# Patient Record
Sex: MALE | Race: WHITE | NOT HISPANIC OR LATINO | Employment: UNEMPLOYED | URBAN - METROPOLITAN AREA
[De-identification: names, ages, dates, MRNs, and addresses within clinical notes are randomized per-mention and may not be internally consistent; named-entity substitution may affect disease eponyms.]

---

## 2017-07-12 ENCOUNTER — HOSPITAL ENCOUNTER (INPATIENT)
Facility: HOSPITAL | Age: 32
LOS: 2 days | Discharge: HOME | DRG: 897 | End: 2017-07-14
Attending: PSYCHIATRY & NEUROLOGY | Admitting: PSYCHIATRY & NEUROLOGY
Payer: MEDICAID

## 2017-07-12 DIAGNOSIS — F29 UNSPECIFIED PSYCHOSIS: ICD-10-CM

## 2017-07-12 DIAGNOSIS — F19.10 SUBSTANCE ABUSE, DAILY USE: Primary | Chronic | ICD-10-CM

## 2017-07-12 PROBLEM — F43.10 PTSD (POST-TRAUMATIC STRESS DISORDER): Chronic | Status: ACTIVE | Noted: 2017-07-12

## 2017-07-12 PROCEDURE — 12400001 HC PSYCH SEMI-PRIVATE ROOM

## 2017-07-12 RX ORDER — THIAMINE HCL 100 MG
100 TABLET ORAL DAILY
Status: DISCONTINUED | OUTPATIENT
Start: 2017-07-13 | End: 2017-07-14 | Stop reason: HOSPADM

## 2017-07-12 RX ORDER — IBUPROFEN 200 MG
1 TABLET ORAL
Status: DISCONTINUED | OUTPATIENT
Start: 2017-07-13 | End: 2017-07-12

## 2017-07-12 RX ORDER — LOPERAMIDE HYDROCHLORIDE 2 MG/1
2 CAPSULE ORAL
Status: DISCONTINUED | OUTPATIENT
Start: 2017-07-12 | End: 2017-07-14 | Stop reason: HOSPADM

## 2017-07-12 RX ORDER — IBUPROFEN 400 MG/1
400 TABLET ORAL EVERY 6 HOURS PRN
Status: DISCONTINUED | OUTPATIENT
Start: 2017-07-12 | End: 2017-07-14 | Stop reason: HOSPADM

## 2017-07-12 RX ORDER — FOLIC ACID 1 MG/1
1 TABLET ORAL DAILY
Status: DISCONTINUED | OUTPATIENT
Start: 2017-07-13 | End: 2017-07-14 | Stop reason: HOSPADM

## 2017-07-12 RX ORDER — METHOCARBAMOL 500 MG/1
500 TABLET, FILM COATED ORAL 4 TIMES DAILY PRN
Status: DISCONTINUED | OUTPATIENT
Start: 2017-07-12 | End: 2017-07-14 | Stop reason: HOSPADM

## 2017-07-12 RX ORDER — DIPHENHYDRAMINE HCL 25 MG
4 CAPSULE ORAL
Status: DISCONTINUED | OUTPATIENT
Start: 2017-07-12 | End: 2017-07-12

## 2017-07-12 RX ORDER — OLANZAPINE 10 MG/1
10 TABLET ORAL EVERY 4 HOURS PRN
Status: DISCONTINUED | OUTPATIENT
Start: 2017-07-12 | End: 2017-07-14 | Stop reason: HOSPADM

## 2017-07-12 RX ORDER — OLANZAPINE 10 MG/2ML
10 INJECTION, POWDER, FOR SOLUTION INTRAMUSCULAR EVERY 4 HOURS PRN
Status: DISCONTINUED | OUTPATIENT
Start: 2017-07-12 | End: 2017-07-14 | Stop reason: HOSPADM

## 2017-07-12 RX ORDER — HYDROXYZINE PAMOATE 50 MG/1
50 CAPSULE ORAL NIGHTLY PRN
Status: DISCONTINUED | OUTPATIENT
Start: 2017-07-12 | End: 2017-07-14 | Stop reason: HOSPADM

## 2017-07-12 RX ORDER — CLONIDINE HYDROCHLORIDE 0.1 MG/1
0.1 TABLET ORAL EVERY 4 HOURS PRN
Status: DISCONTINUED | OUTPATIENT
Start: 2017-07-12 | End: 2017-07-14 | Stop reason: HOSPADM

## 2017-07-12 RX ORDER — PRAZOSIN HYDROCHLORIDE 1 MG/1
1 CAPSULE ORAL NIGHTLY
Status: DISCONTINUED | OUTPATIENT
Start: 2017-07-12 | End: 2017-07-14 | Stop reason: HOSPADM

## 2017-07-12 RX ORDER — ONDANSETRON 4 MG/1
4 TABLET, ORALLY DISINTEGRATING ORAL EVERY 6 HOURS PRN
Status: DISCONTINUED | OUTPATIENT
Start: 2017-07-12 | End: 2017-07-14 | Stop reason: HOSPADM

## 2017-07-12 RX ORDER — IBUPROFEN 200 MG
1 TABLET ORAL
Status: DISCONTINUED | OUTPATIENT
Start: 2017-07-13 | End: 2017-07-14 | Stop reason: HOSPADM

## 2017-07-13 LAB
ALBUMIN SERPL BCP-MCNC: 4 G/DL
ALP SERPL-CCNC: 75 U/L
ALT SERPL W/O P-5'-P-CCNC: 12 U/L
AMPHET+METHAMPHET UR QL: NEGATIVE
ANION GAP SERPL CALC-SCNC: 7 MMOL/L
AST SERPL-CCNC: 15 U/L
BARBITURATES UR QL SCN>200 NG/ML: NEGATIVE
BASOPHILS # BLD AUTO: 0.04 K/UL
BASOPHILS NFR BLD: 0.8 %
BENZODIAZ UR QL SCN>200 NG/ML: NEGATIVE
BILIRUB SERPL-MCNC: 1 MG/DL
BILIRUB UR QL STRIP: NEGATIVE
BUN SERPL-MCNC: 14 MG/DL
BZE UR QL SCN: NORMAL
CALCIUM SERPL-MCNC: 9.6 MG/DL
CANNABINOIDS UR QL SCN: NEGATIVE
CHLORIDE SERPL-SCNC: 106 MMOL/L
CHOLEST/HDLC SERPL: 3 {RATIO}
CLARITY UR REFRACT.AUTO: CLEAR
CO2 SERPL-SCNC: 26 MMOL/L
COLOR UR AUTO: YELLOW
CREAT SERPL-MCNC: 1 MG/DL
CREAT UR-MCNC: 195 MG/DL
DIFFERENTIAL METHOD: ABNORMAL
EOSINOPHIL # BLD AUTO: 0.2 K/UL
EOSINOPHIL NFR BLD: 4.2 %
ERYTHROCYTE [DISTWIDTH] IN BLOOD BY AUTOMATED COUNT: 18.6 %
EST. GFR  (AFRICAN AMERICAN): >60 ML/MIN/1.73 M^2
EST. GFR  (NON AFRICAN AMERICAN): >60 ML/MIN/1.73 M^2
ESTIMATED AVG GLUCOSE: 97 MG/DL
FOLATE SERPL-MCNC: 10.7 NG/ML
GLUCOSE SERPL-MCNC: 91 MG/DL
GLUCOSE UR QL STRIP: NEGATIVE
HAV IGM SERPL QL IA: NEGATIVE
HBA1C MFR BLD HPLC: 5 %
HBV CORE IGM SERPL QL IA: NEGATIVE
HBV SURFACE AG SERPL QL IA: NEGATIVE
HCT VFR BLD AUTO: 43.4 %
HCV AB SERPL QL IA: NEGATIVE
HDL/CHOLESTEROL RATIO: 33.7 %
HDLC SERPL-MCNC: 163 MG/DL
HDLC SERPL-MCNC: 55 MG/DL
HGB BLD-MCNC: 13.8 G/DL
HGB UR QL STRIP: NEGATIVE
HIV1+2 IGG SERPL QL IA.RAPID: NEGATIVE
KETONES UR QL STRIP: NEGATIVE
LDLC SERPL CALC-MCNC: 91 MG/DL
LEUKOCYTE ESTERASE UR QL STRIP: NEGATIVE
LYMPHOCYTES # BLD AUTO: 2.1 K/UL
LYMPHOCYTES NFR BLD: 41.5 %
MCH RBC QN AUTO: 22.9 PG
MCHC RBC AUTO-ENTMCNC: 31.8 %
MCV RBC AUTO: 72 FL
METHADONE UR QL SCN>300 NG/ML: NEGATIVE
MONOCYTES # BLD AUTO: 0.4 K/UL
MONOCYTES NFR BLD: 7.9 %
NEUTROPHILS # BLD AUTO: 2.3 K/UL
NEUTROPHILS NFR BLD: 45.6 %
NITRITE UR QL STRIP: NEGATIVE
NONHDLC SERPL-MCNC: 108 MG/DL
OPIATES UR QL SCN: NEGATIVE
PCP UR QL SCN>25 NG/ML: NEGATIVE
PH UR STRIP: 5 [PH] (ref 5–8)
PLATELET # BLD AUTO: 252 K/UL
PMV BLD AUTO: 9.4 FL
POTASSIUM SERPL-SCNC: 4.3 MMOL/L
PROT SERPL-MCNC: 7.3 G/DL
PROT UR QL STRIP: NEGATIVE
RBC # BLD AUTO: 6.03 M/UL
RPR SER QL: NORMAL
SODIUM SERPL-SCNC: 139 MMOL/L
SP GR UR STRIP: 1.02 (ref 1–1.03)
TOXICOLOGY INFORMATION: NORMAL
TRIGL SERPL-MCNC: 85 MG/DL
TSH SERPL DL<=0.005 MIU/L-ACNC: 1.29 UIU/ML
URN SPEC COLLECT METH UR: NORMAL
UROBILINOGEN UR STRIP-ACNC: NEGATIVE EU/DL
VIT B12 SERPL-MCNC: 338 PG/ML
WBC # BLD AUTO: 4.96 K/UL

## 2017-07-13 PROCEDURE — 86592 SYPHILIS TEST NON-TREP QUAL: CPT

## 2017-07-13 PROCEDURE — 80053 COMPREHEN METABOLIC PANEL: CPT

## 2017-07-13 PROCEDURE — 93005 ELECTROCARDIOGRAM TRACING: CPT

## 2017-07-13 PROCEDURE — 82746 ASSAY OF FOLIC ACID SERUM: CPT

## 2017-07-13 PROCEDURE — 99223 1ST HOSP IP/OBS HIGH 75: CPT | Mod: AF,S$PBB,, | Performed by: PSYCHIATRY & NEUROLOGY

## 2017-07-13 PROCEDURE — 36415 COLL VENOUS BLD VENIPUNCTURE: CPT

## 2017-07-13 PROCEDURE — 12400001 HC PSYCH SEMI-PRIVATE ROOM

## 2017-07-13 PROCEDURE — 80074 ACUTE HEPATITIS PANEL: CPT

## 2017-07-13 PROCEDURE — 84443 ASSAY THYROID STIM HORMONE: CPT

## 2017-07-13 PROCEDURE — 80061 LIPID PANEL: CPT

## 2017-07-13 PROCEDURE — 81003 URINALYSIS AUTO W/O SCOPE: CPT

## 2017-07-13 PROCEDURE — 85025 COMPLETE CBC W/AUTO DIFF WBC: CPT

## 2017-07-13 PROCEDURE — 25000003 PHARM REV CODE 250: Performed by: PSYCHIATRY & NEUROLOGY

## 2017-07-13 PROCEDURE — 86703 HIV-1/HIV-2 1 RESULT ANTBDY: CPT

## 2017-07-13 PROCEDURE — 83036 HEMOGLOBIN GLYCOSYLATED A1C: CPT

## 2017-07-13 PROCEDURE — 82607 VITAMIN B-12: CPT

## 2017-07-13 PROCEDURE — 93010 ELECTROCARDIOGRAM REPORT: CPT | Mod: ,,, | Performed by: INTERNAL MEDICINE

## 2017-07-13 PROCEDURE — 80307 DRUG TEST PRSMV CHEM ANLYZR: CPT

## 2017-07-13 RX ADMIN — Medication 100 MG: at 12:07

## 2017-07-13 RX ADMIN — FOLIC ACID 1 MG: 1 TABLET ORAL at 12:07

## 2017-07-13 RX ADMIN — THERA TABS 1 TABLET: TAB at 12:07

## 2017-07-13 NOTE — PROGRESS NOTES
Pt slept 8 hours, irritable and uncooperative during the evening, remained isolated in his room. Will continue to monitor.

## 2017-07-13 NOTE — PROGRESS NOTES
07/13/17 0900 07/13/17 1100 07/13/17 1400   [facility] Group Therapy   Group Name Community Reintegration Stress Management Therapeutic Recreation   Specific Interventions Current Events Relaxation Training Crafts   Participation Level None Supportive;Appropriate;Active Supportive;Appropriate   Participation Quality --  Cooperative;Social Cooperative   Insight/Motivation --  Good Good   Affect/Mood Display --  Appropriate Appropriate   Cognition --  Oriented Oriented
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
## 2017-07-13 NOTE — PROGRESS NOTES
07/12/17 2000   [facility] Group Therapy   Group Name Community Reintegration   Specific Interventions Other (see comments)   Participation Level None

## 2017-07-13 NOTE — ASSESSMENT & PLAN NOTE
"Patient reports command AH to kill himself as well as seeing shadows that began 5 years ago during a stressful time while incarcerated. May be substance induced or related to schizophrenia or schizoaffective d/o (patient also endorses manic symptoms outside of substance use). Using multiple drugs, cocaine, meth. "whatever I can get."    - patient no longer reporting symptoms of psychosis. He states that he had gone 2 weeks without sleep because he was using IV cocaine and meth which caused him to "hear voices" which have now resolved. He denies suicidal thoughts, homicidal thoughts or plan to kill or harm himself. He reiterates that he does not have a mental illness. Patient exhibits goal oriented, linear and logical thought process. Not responding to internal stimuli or distracted by hallucinations. No overt, objective signs of itzel or depression. Behavior is calm and cooperative. He plans to continue to search for rehab placement once discharged. The patient requests discharge and is not suicidal, homicidal or gravely disabled. Patient adamantly refusing all medications at this time despite multiple offers.  "
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
## 2017-07-13 NOTE — PLAN OF CARE
Problem: Patient Care Overview (Adult)  Goal: Plan of Care Review  Outcome: Ongoing (interventions implemented as appropriate)  Pt admitted this evening uncooperative for assessment irritable and tired. Pt answers are superficial would not be specific about drug and alcohol use. Interview terminated pt went to room and is in bed resting. Refused HS medicine will continue to monitor for safety and withdrawals.     Problem: Impaired Control (Excessive Substance Use) (Adult)  Goal: Participates in Recovery Program  Outcome: Ongoing (interventions implemented as appropriate)  Pt isolated in his room all night.     Problem: Safety Awareness Impairment (Excessive Substance Use) (Adult)  Goal: Enhanced Safety Awareness  Outcome: Ongoing (interventions implemented as appropriate)  Environmental and safety rounds completed, fall precautions in place.     Problem: Physiological Impairment (Excessive Substance Use) (Adult)  Goal: Improved Physiologic Symptoms  Outcome: Ongoing (interventions implemented as appropriate)  Pt vital signs will be monitored every 4 hours, for withdrawal symptoms.
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
## 2017-07-13 NOTE — PT/OT/SLP PROGRESS
Occupational Therapy  Pt Update    Ajith Prajapati  MRN: [id number]    Patient not seen today secondary to team requested to hold so pt could sleep due to increased agitation. Will follow-up at next available time..    DARIA Serna  7/13/2017

## 2017-07-13 NOTE — HOSPITAL COURSE
"7/12/17: transferred from OSH for AVH and substance abuse. Also endorses PTSD symptoms and possible manic symptoms outside of substance use. Started on prazosin 1 mg qHS and Zyprexa PRN agitation as well as PRN medications for withdrawal symptoms.    7/14/17- patient no longer reporting symptoms of psychosis. He states that he had gone 2 weeks without sleep because he was using IV cocaine and meth which caused him to "hear voices" which have now resolved. He denies suicidal thoughts, homicidal thoughts or plan to kill or harm himself. He reiterates that he does not have a mental illness. Patient exhibits goal oriented, linear and logical thought process. Not responding to internal stimuli or distracted by hallucinations. No overt, objective signs of itzel or depression. Behavior is calm and cooperative. He plans to continue to search for rehab placement once discharged. The patient requests discharge and is not suicidal, homicidal or gravely disabled. Patient adamantly refusing all medications at this time despite multiple offers.  "

## 2017-07-14 VITALS
TEMPERATURE: 99 F | DIASTOLIC BLOOD PRESSURE: 75 MMHG | RESPIRATION RATE: 16 BRPM | WEIGHT: 160 LBS | SYSTOLIC BLOOD PRESSURE: 139 MMHG | HEIGHT: 68 IN | HEART RATE: 66 BPM | BODY MASS INDEX: 24.25 KG/M2

## 2017-07-14 PROBLEM — F19.929: Status: ACTIVE | Noted: 2017-07-12

## 2017-07-14 PROBLEM — F19.951: Status: ACTIVE | Noted: 2017-07-12

## 2017-07-14 PROCEDURE — 99239 HOSP IP/OBS DSCHRG MGMT >30: CPT | Mod: AF,S$PBB,, | Performed by: PSYCHIATRY & NEUROLOGY

## 2017-07-14 NOTE — PROGRESS NOTES
Orders for discharge received.  Information on follow up care provided.  Patient provided information on rehab facilities and shelters.  Verbalized understanding of discharge instructions.  Denies SI or HI.  All belongings gathered.  Escorted to exit by staff to [employer] vehicle.
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
## 2017-07-14 NOTE — PROGRESS NOTES
"Pt slept 8 hours, irritable and angry on the evening shift. He refused HS medicine. States " I do not need any medicine, I just need to go to rehab". Asked the pt to quit pacing the unit trying to intimidate others. He did go quietly sit in the dayroom and watch TV until bed time after I spoke to him. MVC in place will continue to monitor.   "

## 2017-07-14 NOTE — PROGRESS NOTES
07/14/17 0900 07/14/17 1000 07/14/17 1100   [facility]   Group Name Community Reintegration Mental Awareness Stress Management   Specific Interventions Current Events Sensory Stimulation Relaxation Training   Participation Level None None None

## 2017-07-14 NOTE — PROGRESS NOTES
sw was informed by Ms. Kiran, pt's Medicaid does not cover for any of the commercial  IOP's in the [facility]. only substance abuse.

## 2017-07-14 NOTE — PLAN OF CARE
Problem: Patient Care Overview (Adult)  Goal: Plan of Care Review  Outcome: Ongoing (interventions implemented as appropriate)  POC discussed with pt, irritated yet cooperative on the unit. Follows direction and attends group with some participation. Not med compliant, poor hygiene,and good appetite. Denies SI/HI/AVH, angry affect, thoughts are focused on discharge. Mood is angry. Out visible on the unit. Safety plan reviewed and environmental rounds done. Reviewed medicine with pt will require further instruction. Pt given time to ask questions, all questions answered. MVC in place will continue to monitor.     Problem: Impaired Control (Excessive Substance Use) (Adult)  Goal: Participates in Recovery Program  Outcome: Ongoing (interventions implemented as appropriate)  Attended group with some participation.     Problem: Safety Awareness Impairment (Excessive Substance Use) (Adult)  Goal: Enhanced Safety Awareness  Outcome: Ongoing (interventions implemented as appropriate)  Pt being monitored for alcohol and drug withdrawals.     Problem: Physiological Impairment (Excessive Substance Use) (Adult)  Goal: Improved Physiologic Symptoms  Outcome: Ongoing (interventions implemented as appropriate)  Pt has no signs of withdrawal. Vital signs WDL's.

## 2017-07-14 NOTE — PROGRESS NOTES
sw referred pt to [facility] for consideration. Waiting list at this time. Sw will provide resource list available.

## 2017-07-14 NOTE — PROGRESS NOTES
Assumed pt care pacing in the yoder, refusing medicine. Attended group and participated, angry and frustrated does not want to be here. When asked to settle down he went in the day room to watch TV. Care explained he has no desire to participate. Denies SI/HI/AVH. MVC in place will continue to monitor.
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
## 2017-07-14 NOTE — PROGRESS NOTES
Staff reported patient had took his discharge papers and referral information out and must have left them on the food cart before leaving.
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
## 2017-07-14 NOTE — PROGRESS NOTES
Patient out of bed for vital signs and for breakfast.  Only a few minutes after breakfast, nurse called for patient as he laid under bed covers, but patient refused to answer.  Unable to provide scheduled meds at this time, will attempt later.

## 2021-03-10 ENCOUNTER — TELEPHONE (OUTPATIENT)
Dept: FAMILY MEDICINE | Facility: CLINIC | Age: 36
End: 2021-03-10

## 2021-04-17 PROBLEM — F19.929: Status: ACTIVE | Noted: 2021-04-17

## 2021-04-17 PROBLEM — I63.20 CEREBROVASCULAR ACCIDENT (CVA) DUE TO OCCLUSION OF PRECEREBRAL ARTERY: Status: ACTIVE | Noted: 2021-04-17

## 2021-04-17 PROBLEM — R03.0 ELEVATED BLOOD PRESSURE READING: Status: ACTIVE | Noted: 2021-04-17

## 2021-05-12 ENCOUNTER — PATIENT MESSAGE (OUTPATIENT)
Dept: RESEARCH | Facility: HOSPITAL | Age: 36
End: 2021-05-12